# Patient Record
(demographics unavailable — no encounter records)

---

## 2017-12-14 NOTE — RAD
THREE VIEWS LEFT HIP:

 

DATE: 12/14/17.

 

HISTORY: 

Bilateral hip pain.  No history of injury.

 

COMPARISON: 

None available.

 

FINDINGS: 

There is narrowing of the left hip joint space and there is subchondral sclerosis and subchondral cys
tic changes involving the acetabulum and left femoral head most compatible with osteoarthritis.  Ther
e is no fracture or dislocation seen.  Degenerative changes are present at the pubic symphysis.  Vasc
ular calcifications overlie the pelvis.

 

IMPRESSION: 

Left hip osteoarthritis with degenerative changes involving the pubic symphysis.

 

POS: [facility]

## 2017-12-14 NOTE — RAD
TWO VIEWS RIGHT HIP:

 

History: Right hip pain for one month. 

 

FINDINGS: 

Two views right hip shows no evidence of acute fracture or dislocation. No degenerative changes are s
een. Mild soft tissue swelling is present. 

 

IMPRESSION: 

No significant right hip abnormality. 

 

POS: [facility]

## 2018-08-31 NOTE — MRI
MRI BRAIN WITH AND WITHOUT IV CONTRAST:

 

08/31/2018

 

HISTORY:

Abnormal CT scan demonstrating mass in left posterior temporoparietal region in a patient with a hist
ory of lung cancer and altered mental 

status.

 

COMPARISON:

MRI brain on 09/18/2015 as well as CT head on 08/27/2018.

 

FINDINGS:

There is an enhancing mass seen in the left temporoparietal region, measuring 2.4 cm craniocaudal x 2
.5 cm AP x 2.3 cm transverse.  There is increased signal intensity on the pre-contrast T1 weighted im
ages with evidence of susceptibility artifact on the gradient echo images, consistent with hemorrhage
 within this lesion.  No addition enhancing lesions are seen throughout the brain.  There is vasogeni
c edema adjacent to the mass in the left temporoparietal lobe with adjacent mild sulcal effacement.  
There is no shift of the midline structures.

 

This mass was not visualized on the prior study from 2015.

 

Again noted are chronic small vessel ischemic changes and cerebral volume loss.  Remote cavitated lac
unar infarctions are again seen in each basal ganglia, as well as the right thalamus.  There is a sma
ll amount of hemosiderin deposition associated with the lacunar infarction in the left basal ganglia.


 

There is no evidence of an acute infarction.  The ventricular system is normal in size, shape, and po
sition for the degree of volume loss.

 

Appropriate flow voids are demonstrated at the base of the brain.

 

The native lenses are absent.

 

There is mucosal thickening/effusion involving several right-sided mastoid air cells with minimal mas
toid effusions on the left.

 

The visualized paranasal sinuses are clear.

 

IMPRESSION:

1.  Hemorrhagic enhancing lesion, left temporoparietal region with associated vasogenic edema.  Given
 the patient's history of lung cancer, this likely represents a solitary hemorrhagic metastatic lesio
n.

 

2.  Moderate to severe chronic small vessel ischemic changes.

 

3.  Remote cavitated lacunar infarctions in each basal ganglia and right thalamus.

 

4.  Mastoid effusions bilaterally, greater on the right.

 

POS: [facility]

## 2019-01-29 NOTE — MRI
MRI BRAIN WITH AND WITHOUT CONTRAST:

 

DATE:

1-29-19

 

HISTORY:

74-year-old female with lung cancer, metastatic to the brain, status post radiosurgery. Restaging.

 

COMPARISON:

8-31-18

 

TECHNIQUE:

Multiple sequences obtained in axial, sagittal, and coronal planes; pre and post IV injection of gado
linium-based contrast agent: 13 ml MultiHance

 

FINDINGS:

The previously described hemorrhagic enhancing intraaxial tumor mass in the left parietal lobe, encro
aching upon the superolateral edge of the temporal lobe, has slightly decreased in size. It currently
 measures approximately 2 x 2 x 1.7 cm. There is significantly less surrounding vasogenic edema now t
john before. It still consists mostly of intracellular methemoglobin, but now contains a small amount 
of extrasellar methemoglobin at its posterior aspect. 

 

There are no new enhancing metastatic lesions. 

 

Again noted is the old confluent infarction involving the left basal ganglia, left caudate nucleus, a
nd adjacent left corona radiata, with associated mild focal ex vacuo dilation at a region of the body
 of the left lateral ventricle. 

 

There is a tiny old lacunar infarction in the right thalamus. Tiny focal fluid signal intensity in th
e lateral aspect of the right basal ganglia could represent a dilated Virchow-Alexander space rather than
 a tiny old lacunar infarction (no surrounding gliosis). 

 

There are confluent and patchy moderate chronic ischemic white matter changes in the periventricular 
and deep cerebral white matter, encroaching upon subcortical white matter, to a similar degree as pre
viously. No mass effect or midline shift. No obstructive hydrocephalus. There is a right mastoid effu
emery. No restricted diffusion. 

 

IMPRESSION:

1. Mild interval decrease in size of the hemorrhagic tumor in the left parietal lobe, with interval s
ignificant reduction in the surrounding vasogenic edema, consistent with interval response to therapy
 of the solitary brain metastasis. 

2. Moderate chronic ischemic white matter changes. 

3. 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 

4. Tiny old lacunar infarction of right thalamus. 

5. Right mastoid effusion. 

6. No new intracranial metastasis. 

 

 

SIMÓN BONILLA

 

POS: [facility]

## 2019-02-27 NOTE — CT
CT THORAX WITH IV CONTRAST:

 

Date: 2-27-19 

 

History: Lung cancer. History of right upper lobectomy in 2015. 

 

Comparison: 12-2-15, PET CT 1-26-16

 

FINDINGS:

There have been interval post-surgical changes related to right upper lobectomy. Previously noted spi
culated mass in the right upper lobe is no longer visualized. There is suture material seen within th
e right suprahilar region with linear densities present in the region of the right middle lobe likely
 related to areas of mild scarring. Linear densities are also seen at the right lung base peripherall
y, probably related to areas of mild scarring. No pulmonary nodule or mass is seen in the lungs bilat
erally. 

 

Dense vascular calcifications are seen in the thoracic aorta and visualized upper abdominal aorta as 
well as involving the coronary arteries. 

 

Left subclavian Mediport catheter is noted in place with the tip overlying the proximal SVC. 

 

Mild compression fractures involving the superior endplatea of the T12 and L2 vertebral bodies are ag
ain seen and stable from prior exam in 2015. No lytic or sclerotic lesions are appreciated. There are
 mild scattered degenerative changes in the thoracic spine. 

 

A few subcentimeter too small to characterize hypodense lesions are seen in each kidney. CT abdomen i
s otherwise unchanged from the prior exam in 2015. Vascular calcifications in the upper abdominal aor
ta. 

 

IMPRESSION: 

1. Post-surgical changes related to right upper lobectomy. No pulmonary nodule or mass is seen on thi
s examination, and there is no evidence of lymphadenopathy. 

2. Dense vascular calcifications. 

3. Compression fracture along the superior endplate of the T12 and L2 vertebral bodies, with stable d
egree of height loss compared to study in 2015.  

 

 

POS: [facility]

## 2019-04-01 NOTE — MRI
FExam: Brain MRI with and without contrast



HISTORY: Evaluate intracranial metastases



COMPARISON: 1/29/2019



FINDINGS:

Stable hemosiderin deposition involving a metastatic deposit in the left temporal lobe. No additional
 areas of hemorrhage on the axial gradient echo sequence

Calvarium is a normal T1 marrow signal intensity. Midline ring critical structures are unremarkable

Stable T2 and FLAIR white matter hyperintensities due to a combination of chronic small vessel ischem
ic change as well as vasogenic edema, in the left temporal lobe. Redemonstration of an intrinsically 
T1 hyperintense lesion in the left temporal lobe with evidence of components of enhancement, measurin
g 1.9 cm cranial caudal by 1.6 cm mediolateral by 1.9 cm anterior-posterior. Previously, this lesion 
measured 1.8 x 1.9 x 1.7 cm. No appreciable change.

Central arterial flow voids are maintained. Absent restricted diffusion



Malacic changes secondary to previous lacunar infarcts involving the left and right deep gray matter 
structures.



Persistent opacification of the right mastoid air cells.

No new or additional areas of enhancement in the cerebrum or cerebellum.



IMPRESSION:

1. Essentially stable metastatic focus in the left temporal lobe.

2. No new metastatic deposits.



Reported By: Rodney Shelby 

Electronically Signed:  4/1/2019 10:19 AM

## 2019-07-03 NOTE — MRI
MRI OF BRAIN WITH AND WITHOUT CONTRAST:

7/3/19

 

COMPARISON: 

4/1/19.

 

HISTORY: 

Metastatic disease. Status post radiosurgery. Follow-up exam. 

 

FINDINGS: 

The calvarium has a normal T1 marrow signal intensity. Midline brain parenchymal structures are unrem
arkable. 

 

There is evidence of hemosiderin deposition especially with the previously identified metastatic lesi
on in the left temporal lobe. There is a second hyponintense lesion in the left temporal lobe.  

 

There is stable T1 hyperintensity with associated mixed signal intensity on the T2 weighted images wi
th regards to a lesion centered in the left temporal lobe. Postcontrast images demonstrate a stable p
eripherally enhancing centrally mixed signal intensity lesion in the left temporal lobe  measuring 1.
7 x 2.1 cm. There does appear to be interval development of a new satellite lesion measuring 0.5 x 0.
4 cm. There is associated hemosiderin deposition, which is new since the previous exam. No additional
 evidence of left cerebral metastases. No pathologic enhancement with regards to the right cerebrum o
r cerebellum. 

 

Central arterial flow voids are maintained. No restricted diffusion. 

 

Adequate aeration of the sinuses and mastoid air cells.

 

IMPRESSION: 

1.      Stable enhancing mass centered in the left temporal lobe, currently measuring 1.7 x 2.1 cm (p
reviously measuring 1.6 x 2.0 cm). There is interval development of a smaller satellite lesion just m
edial to the left temporal lesion. On the gradient echo sequence, there is hypointensities suggesting
 hemosiderin deposition. 

 

POS: [facility]

## 2019-08-22 NOTE — MRI
MRI OF BRAIN WITH AND WITHOUT CONTRAST:

8/22/19

 

CLINICAL HISTORY: 

Lung malignancy. History of metastases. 

 

Reference made to 7/3/19 exam. 

 

FINDINGS: 

Enhancing, intra-axial mass of the left temporoparietal lobe is redemonstrated and measures 2 cm AP x
 1.9 cm transverse, stable to slightly increased in volume. Adjacent intra-axial enhancing lesion jus
t medially persists, and has increased since prior exam, and there are likely two immediately adjacen
t lesions within this region now present indicating interval progression. The largest of these two le
sions is approximately 1 cm in diameter, and the immediately adjacent smaller enhancing focus is appr
oximately 6-7 mm in diameter. Progressive vasogenic edema in this region also present. Intrinsic T1 h
yperintensity as well as susceptibility artifact redemonstrated compatible with tumoral hemorrhage. D
egree of hemorrhagic susceptibility has increased correlating to the above described progression of m
etastatic lesions. 

 

Persistent diffuse white matter signal alteration throughout each hemisphere noted. No acute territor
ial infarction. No midline shift. Stable size of ventricular system. 

 

Bilateral mastoid fluid, right greater than left is again seen. There is asymmetric nonspecific enhan
cement about the right temporomandibular joint, predominantly locating to the confines of the synoviu
m. Slight underlying marrow edema of the right mandibular condylar head is seen. 

 

IMPRESSION: 

1.      Progressive intracranial metastases, as above.

2.      Nonspecific prominent enhancement about the right TMJ. This could relate to an inflammatory a
rthropathy, as it predominantly localizing to the synovium of the right temporomandibular joint with 
slight underlying marrow edema of the right mandibular condylar head. Recommend correlation for evide
nce of inflammatory arthropathy, and as necessary follow-up may be obtained. 

 

POS: [facility]